# Patient Record
Sex: MALE | URBAN - METROPOLITAN AREA
[De-identification: names, ages, dates, MRNs, and addresses within clinical notes are randomized per-mention and may not be internally consistent; named-entity substitution may affect disease eponyms.]

---

## 2020-07-06 ENCOUNTER — NURSE ONLY (OUTPATIENT)
Dept: PRIMARY CARE CLINIC | Age: 45
End: 2020-07-06

## 2020-07-06 PROCEDURE — 99211 OFF/OP EST MAY X REQ PHY/QHP: CPT | Performed by: NURSE PRACTITIONER

## 2020-07-06 NOTE — PROGRESS NOTES
Ron Florentino received a viral test for COVID-19. They were educated on isolation and quarantine as appropriate. For any symptoms, they were directed to seek care from their PCP, given contact information to establish with a doctor, directed to an urgent care or the emergency room.

## 2020-07-10 LAB
SARS-COV-2: NOT DETECTED
SOURCE: NORMAL

## 2020-07-14 ENCOUNTER — TELEPHONE (OUTPATIENT)
Dept: ADMINISTRATIVE | Age: 45
End: 2020-07-14

## 2024-03-15 ENCOUNTER — OFFICE VISIT (OUTPATIENT)
Dept: PRIMARY CARE CLINIC | Age: 49
End: 2024-03-15

## 2024-03-15 VITALS — WEIGHT: 226 LBS | HEART RATE: 90 BPM | OXYGEN SATURATION: 97 % | TEMPERATURE: 98.2 F

## 2024-03-15 DIAGNOSIS — M79.10 MYALGIA: ICD-10-CM

## 2024-03-15 DIAGNOSIS — R11.2 NAUSEA AND VOMITING, UNSPECIFIED VOMITING TYPE: ICD-10-CM

## 2024-03-15 DIAGNOSIS — R68.83 CHILLS: ICD-10-CM

## 2024-03-15 DIAGNOSIS — R51.9 ACUTE NONINTRACTABLE HEADACHE, UNSPECIFIED HEADACHE TYPE: ICD-10-CM

## 2024-03-15 DIAGNOSIS — R42 DIZZINESS: ICD-10-CM

## 2024-03-15 DIAGNOSIS — R05.1 ACUTE COUGH: Primary | ICD-10-CM

## 2024-03-15 DIAGNOSIS — R09.81 NASAL CONGESTION: ICD-10-CM

## 2024-03-15 DIAGNOSIS — J06.9 VIRAL URI: ICD-10-CM

## 2024-03-15 LAB
INFLUENZA A ANTIGEN, POC: NEGATIVE
INFLUENZA B ANTIGEN, POC: NEGATIVE

## 2024-03-15 RX ORDER — BROMPHENIRAMINE MALEATE, PSEUDOEPHEDRINE HYDROCHLORIDE, AND DEXTROMETHORPHAN HYDROBROMIDE 2; 30; 10 MG/5ML; MG/5ML; MG/5ML
5 SYRUP ORAL 4 TIMES DAILY PRN
Qty: 118 ML | Refills: 0 | Status: SHIPPED | OUTPATIENT
Start: 2024-03-15

## 2024-03-15 RX ORDER — ONDANSETRON 4 MG/1
4 TABLET, ORALLY DISINTEGRATING ORAL 3 TIMES DAILY PRN
Qty: 21 TABLET | Refills: 0 | Status: SHIPPED | OUTPATIENT
Start: 2024-03-15

## 2024-03-15 ASSESSMENT — ENCOUNTER SYMPTOMS
VOMITING: 1
NAUSEA: 1
EYES NEGATIVE: 1
ALLERGIC/IMMUNOLOGIC NEGATIVE: 1
SINUS PAIN: 1
COUGH: 1
RHINORRHEA: 1

## 2024-03-15 NOTE — PROGRESS NOTES
Alex Ornelas (:  1975) is a 48 y.o. male,New patient, here for evaluation of the following chief complaint(s):  Cough (Patient presents today with 5 day history of symptoms. States his kids were sick over the weekend and he started with symptoms on Monday. C/o cough, congestion, body aches, hot/cold chills, nausea, vomiting. States getting in bad cough spells and keeping him awake at night. Muscles hurting from coughing. Has tried OTC tylenol, ibuprofen, and cold & cough medicine. ) and Congestion    Alex is here today with concerns for a cough, congestion, myalgia, chills/sweats, n/v, headache, dizziness for about 5 days.  OTC treatments are helping mildly. Coughing every few minutes, dry.        ASSESSMENT/PLAN:  1. Acute cough  -     brompheniramine-pseudoephedrine-DM 2-30-10 MG/5ML syrup; Take 5 mLs by mouth 4 times daily as needed for Cough or Congestion, Disp-118 mL, R-0Normal  -     POCT Influenza A/B Antigen (BD Veritor)  -     COVID-19  2. Nausea and vomiting, unspecified vomiting type  -     ondansetron (ZOFRAN-ODT) 4 MG disintegrating tablet; Take 1 tablet by mouth 3 times daily as needed for Nausea or Vomiting, Disp-21 tablet, R-0Normal  -     POCT Influenza A/B Antigen (BD Veritor)  -     COVID-19  3. Nasal congestion  4. Chills  5. Myalgia  6. Acute nonintractable headache, unspecified headache type  7. Dizziness  8. Viral URI    - POCT Influenza A/B: NEGATIVE  - Covid swab sent - Will call with results.  Sent patient a link to sign up for MyChart.  - Cough suppressant and Zofran sent to help with symptoms.   - Discussed symptomatic management.  May take tylenol or ibuprofen as needed for pain or fever.  May use throat lozenges, warm teas and honey, salt water gargles, humidified air, drink cold fluids for relief. Increase fluid intake. May return to activity when fever free for 24 hours without the use of fever-reducing medications, such as tylenol or ibuprofen.   - Discussed covid

## 2024-03-16 LAB — SARS-COV-2 N GENE RESP QL NAA+PROBE: NOT DETECTED

## 2024-03-18 DIAGNOSIS — J06.9 VIRAL URI: Primary | ICD-10-CM

## 2024-03-18 RX ORDER — METHYLPREDNISOLONE 4 MG/1
TABLET ORAL
Qty: 21 TABLET | Refills: 0 | Status: SHIPPED | OUTPATIENT
Start: 2024-03-18

## 2024-03-26 ENCOUNTER — TELEPHONE (OUTPATIENT)
Dept: PRIMARY CARE CLINIC | Age: 49
End: 2024-03-26

## 2024-03-26 NOTE — TELEPHONE ENCOUNTER
Called patient after speaking with NP Dexter, counseled patient if he wasn't seeing any signs of improvement after these 11 days he should be re-evaluated. Counseled we were happy to see him here in clinic or he can follow up with his PCP whatever he would like to do. Patient states he is going to call back and schedule appointment if he can figure out childcare.

## 2024-03-26 NOTE — TELEPHONE ENCOUNTER
Patient called due to no relief of symptoms from his appointment on 3/15/24 and was wondering whether he should see his PCP or come back to the office. Please Advise.